# Patient Record
Sex: FEMALE | Race: WHITE
[De-identification: names, ages, dates, MRNs, and addresses within clinical notes are randomized per-mention and may not be internally consistent; named-entity substitution may affect disease eponyms.]

---

## 2017-01-04 ENCOUNTER — HOSPITAL ENCOUNTER (EMERGENCY)
Age: 82
Discharge: HOME | End: 2017-01-04
Payer: MEDICARE

## 2017-01-04 DIAGNOSIS — Z86.73: ICD-10-CM

## 2017-01-04 DIAGNOSIS — M19.90: ICD-10-CM

## 2017-01-04 DIAGNOSIS — Z79.82: ICD-10-CM

## 2017-01-04 DIAGNOSIS — Z87.891: ICD-10-CM

## 2017-01-04 DIAGNOSIS — K21.9: ICD-10-CM

## 2017-01-04 DIAGNOSIS — A09: Primary | ICD-10-CM

## 2017-01-04 DIAGNOSIS — E03.9: ICD-10-CM

## 2017-01-04 DIAGNOSIS — I10: ICD-10-CM

## 2017-01-04 PROCEDURE — 87493 C DIFF AMPLIFIED PROBE: CPT

## 2017-01-04 PROCEDURE — 83690 ASSAY OF LIPASE: CPT

## 2017-01-04 PROCEDURE — 85025 COMPLETE CBC W/AUTO DIFF WBC: CPT

## 2017-01-04 PROCEDURE — 36415 COLL VENOUS BLD VENIPUNCTURE: CPT

## 2017-01-04 PROCEDURE — 87045 FECES CULTURE AEROBIC BACT: CPT

## 2017-01-04 PROCEDURE — 99283 EMERGENCY DEPT VISIT LOW MDM: CPT

## 2017-01-04 PROCEDURE — 87046 STOOL CULTR AEROBIC BACT EA: CPT

## 2017-01-04 PROCEDURE — 80053 COMPREHEN METABOLIC PANEL: CPT

## 2017-02-21 ENCOUNTER — HOSPITAL ENCOUNTER (OUTPATIENT)
Age: 82
End: 2017-02-21
Payer: MEDICARE

## 2017-03-30 ENCOUNTER — HOSPITAL ENCOUNTER (OUTPATIENT)
Age: 82
Discharge: HOME | End: 2017-03-30
Payer: MEDICARE

## 2017-03-30 DIAGNOSIS — R31.9: Primary | ICD-10-CM

## 2017-06-23 ENCOUNTER — HOSPITAL ENCOUNTER (OUTPATIENT)
Dept: HOSPITAL 76 - LAB | Age: 82
Discharge: HOME | End: 2017-06-23
Attending: PSYCHIATRY & NEUROLOGY
Payer: MEDICARE

## 2017-06-23 DIAGNOSIS — G24.9: Primary | ICD-10-CM

## 2017-06-23 PROCEDURE — 86038 ANTINUCLEAR ANTIBODIES: CPT

## 2017-06-23 PROCEDURE — 86780 TREPONEMA PALLIDUM: CPT

## 2017-06-23 PROCEDURE — 82390 ASSAY OF CERULOPLASMIN: CPT

## 2017-06-23 PROCEDURE — 82525 ASSAY OF COPPER: CPT

## 2017-06-23 PROCEDURE — 36415 COLL VENOUS BLD VENIPUNCTURE: CPT

## 2017-06-26 LAB
ANA SER QL: NEGATIVE
T PALLIDUM AB SER QL IA: NEGATIVE

## 2017-06-28 LAB — COPPER SERPL-MCNC: 121 MCG/DL (ref 70–175)

## 2017-07-13 ENCOUNTER — HOSPITAL ENCOUNTER (OUTPATIENT)
Dept: HOSPITAL 76 - EMS | Age: 82
Discharge: ANOTHER HOSPITAL | End: 2017-07-13
Attending: SURGERY
Payer: MEDICARE

## 2017-07-13 DIAGNOSIS — R06.02: Primary | ICD-10-CM

## 2017-09-05 ENCOUNTER — HOSPITAL ENCOUNTER (OUTPATIENT)
Dept: HOSPITAL 76 - EMS | Age: 82
Discharge: ANOTHER HOSPITAL | End: 2017-09-05
Attending: SURGERY
Payer: MEDICARE

## 2017-09-05 DIAGNOSIS — R25.1: Primary | ICD-10-CM

## 2017-09-05 DIAGNOSIS — M79.604: ICD-10-CM

## 2018-03-29 ENCOUNTER — HOSPITAL ENCOUNTER (OUTPATIENT)
Dept: HOSPITAL 76 - EMS | Age: 83
End: 2018-03-29
Attending: SURGERY
Payer: MEDICARE

## 2018-03-29 DIAGNOSIS — R20.2: ICD-10-CM

## 2018-03-29 DIAGNOSIS — R06.02: Primary | ICD-10-CM

## 2019-03-08 ENCOUNTER — HOSPITAL ENCOUNTER (OUTPATIENT)
Dept: HOSPITAL 76 - EMS | Age: 84
Discharge: ANOTHER HOSPITAL | End: 2019-03-08
Attending: SURGERY
Payer: MEDICARE

## 2019-03-08 DIAGNOSIS — F41.9: Primary | ICD-10-CM

## 2019-03-08 DIAGNOSIS — R06.4: ICD-10-CM

## 2019-03-08 DIAGNOSIS — R25.3: ICD-10-CM

## 2019-03-18 ENCOUNTER — HOSPITAL ENCOUNTER (INPATIENT)
Dept: HOSPITAL 76 - ED | Age: 84
LOS: 4 days | Discharge: HOSPICE | DRG: 66 | End: 2019-03-22
Attending: NURSE PRACTITIONER | Admitting: INTERNAL MEDICINE
Payer: MEDICARE

## 2019-03-18 ENCOUNTER — HOSPITAL ENCOUNTER (OUTPATIENT)
Dept: HOSPITAL 76 - EMS | Age: 84
Discharge: ANOTHER HOSPITAL | End: 2019-03-18
Attending: SURGERY
Payer: MEDICARE

## 2019-03-18 DIAGNOSIS — M19.90: ICD-10-CM

## 2019-03-18 DIAGNOSIS — R32: ICD-10-CM

## 2019-03-18 DIAGNOSIS — F41.9: ICD-10-CM

## 2019-03-18 DIAGNOSIS — Z96.659: ICD-10-CM

## 2019-03-18 DIAGNOSIS — H91.90: ICD-10-CM

## 2019-03-18 DIAGNOSIS — I62.9: Primary | ICD-10-CM

## 2019-03-18 DIAGNOSIS — R40.4: ICD-10-CM

## 2019-03-18 DIAGNOSIS — K21.9: ICD-10-CM

## 2019-03-18 DIAGNOSIS — Z87.11: ICD-10-CM

## 2019-03-18 DIAGNOSIS — R46.4: Primary | ICD-10-CM
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
## 2019-03-18 DIAGNOSIS — Z66: ICD-10-CM

## 2019-03-18 DIAGNOSIS — E03.9: ICD-10-CM

## 2019-03-18 DIAGNOSIS — Z79.82: ICD-10-CM

## 2019-03-18 DIAGNOSIS — I10: ICD-10-CM
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
## 2019-03-18 DIAGNOSIS — R41.82: ICD-10-CM

## 2019-03-18 LAB
ALBUMIN DIAFP-MCNC: 4.2 G/DL (ref 3.2–5.5)
ALBUMIN/GLOB SERPL: 1.2 {RATIO} (ref 1–2.2)
ALP SERPL-CCNC: 74 IU/L (ref 42–121)
ALT SERPL W P-5'-P-CCNC: 19 IU/L (ref 10–60)
AMPHET UR QL SCN: NEGATIVE
ANION GAP SERPL CALCULATED.4IONS-SCNC: 10 MMOL/L (ref 6–13)
APAP SERPL-MCNC: < 10 UG/ML (ref 10–30)
AST SERPL W P-5'-P-CCNC: 27 IU/L (ref 10–42)
BASOPHILS NFR BLD AUTO: 0 10^3/UL (ref 0–0.1)
BASOPHILS NFR BLD AUTO: 0.7 %
BENZODIAZ UR QL SCN: NEGATIVE
BILIRUB BLD-MCNC: 0.6 MG/DL (ref 0.2–1)
BUN SERPL-MCNC: 18 MG/DL (ref 6–20)
CALCIUM UR-MCNC: 9 MG/DL (ref 8.5–10.3)
CHLORIDE SERPL-SCNC: 100 MMOL/L (ref 101–111)
CLARITY UR REFRACT.AUTO: CLEAR
CO2 SERPL-SCNC: 26 MMOL/L (ref 21–32)
COCAINE UR-SCNC: NEGATIVE UMOL/L
CREAT SERPLBLD-SCNC: 0.7 MG/DL (ref 0.4–1)
EOSINOPHIL # BLD AUTO: 0.2 10^3/UL (ref 0–0.7)
EOSINOPHIL NFR BLD AUTO: 2.8 %
ERYTHROCYTE [DISTWIDTH] IN BLOOD BY AUTOMATED COUNT: 13.1 % (ref 12–15)
GFRSERPLBLD MDRD-ARVRAT: 79 ML/MIN/{1.73_M2} (ref 89–?)
GLOBULIN SER-MCNC: 3.6 G/DL (ref 2.1–4.2)
GLUCOSE SERPL-MCNC: 146 MG/DL (ref 70–100)
GLUCOSE UR QL STRIP.AUTO: NEGATIVE MG/DL
HGB UR QL STRIP: 13.3 G/DL (ref 12–16)
INR PPP: 1.1 (ref 0.8–1.2)
KETONES UR QL STRIP.AUTO: NEGATIVE MG/DL
LIPASE SERPL-CCNC: 46 U/L (ref 22–51)
LYMPHOCYTES # SPEC AUTO: 1.3 10^3/UL (ref 1.5–3.5)
LYMPHOCYTES NFR BLD AUTO: 20.5 %
MCH RBC QN AUTO: 31.7 PG (ref 27–31)
MCHC RBC AUTO-ENTMCNC: 33.3 G/DL (ref 32–36)
MCV RBC AUTO: 95.2 FL (ref 81–99)
METHADONE UR QL SCN: NEGATIVE
METHAMPHET UR QL SCN: NEGATIVE
MONOCYTES # BLD AUTO: 0.5 10^3/UL (ref 0–1)
MONOCYTES NFR BLD AUTO: 7.8 %
NEUTROPHILS # BLD AUTO: 4.3 10^3/UL (ref 1.5–6.6)
NEUTROPHILS # SNV AUTO: 6.3 X10^3/UL (ref 4.8–10.8)
NEUTROPHILS NFR BLD AUTO: 68.2 %
NITRITE UR QL STRIP.AUTO: NEGATIVE
OPIATES UR QL SCN: NEGATIVE
PDW BLD AUTO: 8.8 FL (ref 7.9–10.8)
PH UR STRIP.AUTO: 6 PH (ref 5–7.5)
PLATELET # BLD: 201 10^3/UL (ref 130–450)
PROT SPEC-MCNC: 7.8 G/DL (ref 6.7–8.2)
PROT UR STRIP.AUTO-MCNC: 30 MG/DL
PROTHROM ACT/NOR PPP: 12.8 SECS (ref 9.9–12.6)
RBC # UR STRIP.AUTO: (no result) /UL
RBC # URNS HPF: (no result) /HPF (ref 0–5)
RBC MAR: 4.18 10^6/UL (ref 4.2–5.4)
SALICYLATES SERPL-MCNC: < 6 MG/DL
SODIUM SERPLBLD-SCNC: 136 MMOL/L (ref 135–145)
SP GR UR STRIP.AUTO: 1.02 (ref 1–1.03)
SQUAMOUS URNS QL MICRO: (no result)
UROBILINOGEN UR QL STRIP.AUTO: (no result) E.U./DL
UROBILINOGEN UR STRIP.AUTO-MCNC: NEGATIVE MG/DL
VOLATILE DRUGS POS SERPL SCN: (no result)

## 2019-03-18 PROCEDURE — 99285 EMERGENCY DEPT VISIT HI MDM: CPT

## 2019-03-18 PROCEDURE — 87275 INFLUENZA B AG IF: CPT

## 2019-03-18 PROCEDURE — 87276 INFLUENZA A AG IF: CPT

## 2019-03-18 PROCEDURE — 81001 URINALYSIS AUTO W/SCOPE: CPT

## 2019-03-18 PROCEDURE — 85610 PROTHROMBIN TIME: CPT

## 2019-03-18 PROCEDURE — 36415 COLL VENOUS BLD VENIPUNCTURE: CPT

## 2019-03-18 PROCEDURE — 96374 THER/PROPH/DIAG INJ IV PUSH: CPT

## 2019-03-18 PROCEDURE — 81003 URINALYSIS AUTO W/O SCOPE: CPT

## 2019-03-18 PROCEDURE — 85025 COMPLETE CBC W/AUTO DIFF WBC: CPT

## 2019-03-18 PROCEDURE — 80053 COMPREHEN METABOLIC PANEL: CPT

## 2019-03-18 PROCEDURE — 87086 URINE CULTURE/COLONY COUNT: CPT

## 2019-03-18 PROCEDURE — 84484 ASSAY OF TROPONIN QUANT: CPT

## 2019-03-18 PROCEDURE — 80320 DRUG SCREEN QUANTALCOHOLS: CPT

## 2019-03-18 PROCEDURE — 93005 ELECTROCARDIOGRAM TRACING: CPT

## 2019-03-18 PROCEDURE — 80306 DRUG TEST PRSMV INSTRMNT: CPT

## 2019-03-18 PROCEDURE — 70450 CT HEAD/BRAIN W/O DYE: CPT

## 2019-03-18 PROCEDURE — 83690 ASSAY OF LIPASE: CPT

## 2019-03-18 PROCEDURE — 99284 EMERGENCY DEPT VISIT MOD MDM: CPT

## 2019-03-18 PROCEDURE — 80329 ANALGESICS NON-OPIOID 1 OR 2: CPT

## 2019-03-18 PROCEDURE — 80307 DRUG TEST PRSMV CHEM ANLYZR: CPT

## 2019-03-18 NOTE — ED PHYSICIAN DOCUMENTATION
History of Present Illness





- Stated complaint


Stated Complaint: ALTERED MENTAL STATUS





- Chief complaint


Chief Complaint: General





- History obtained from


History obtained from: EMS





- Additonal information


Additional information: 





88-year-old female was sent in from her nursing facility where she has on the 

independent care side for altered mental status.  Normally the patient's alert 

and able to function independently.  The patient is unable to provide any 

history





Review of Systems


Unable to obtain: AMS





PD PAST MEDICAL HISTORY





- Past Medical History


Cardiovascular: Hypertension


Respiratory: None


Endocrine/Autoimmune: HyPOthyroidism


GI: GERD, Ulcers


: Incontinence


HEENT: Chronic hearing loss


Psych: Anxiety


Musculoskeletal: Osteoarthritis


Derm: None





- Past Surgical History


Past Surgical History: Yes


General: Cholecystectomy, Appendectomy


Ortho: Knee replacement


/GYN: Hysterectomy


HEENT: Tonsil/Adenoidectomy





- Present Medications


Home Medications: 


                                Ambulatory Orders











 Medication  Instructions  Recorded  Confirmed


 


Aspirin [Children's Aspirin] 81 mg PO DAILY 10/25/13 03/18/19


 


Atenolol 50 mg PO DAILY 10/25/13 03/18/19


 


Levothyroxine [Synthroid] 88 mcg PO QDAC 10/25/13 03/18/19


 


diltiaZEM CD [Cardizem Cd] 180 mg PO DAILY 10/25/13 03/18/19


 


Losartan Potassium 50 mg PO DAILY 04/07/15 03/18/19


 


Diphenoxylate HCl/Atropine 1 each PO Q6H PRN #20 tablet 01/04/17 03/18/19





[Lomotil 2.5-0.025 mg Tablet]   


 


Ondansetron Odt [Zofran] 4 mg TL Q6H PRN #15 tablet 01/04/17 03/18/19














- Allergies


Allergies/Adverse Reactions: 


                                    Allergies











Allergy/AdvReac Type Severity Reaction Status Date / Time


 


ACE Inhibitors Allergy  Unknown Verified 03/18/19 17:52


 


Benzodiazepines Allergy  Unknown Verified 03/18/19 17:52


 


Tetracyclines Allergy  Unknown Verified 03/18/19 17:52


 


novacaine AdvReac  unknown Uncoded 10/24/13 14:44














- Social History


Does the pt smoke?: No


Smoking Status: Never smoker


Does the pt drink ETOH?: No


Does the pt have substance abuse?: No





- Immunizations


Immunizations are current?: No


Immunizations: TDAP >10years/unknown





- POLST


Patient has POLST: Yes





PD ED PE NORMAL





- General


General: Other (The patient is altered, not conversant and provides no history)





- HEENT


HEENT: Atraumatic, PERRL





- Cardiac


Cardiac: RRR, Strong equal pulses





- Respiratory


Respiratory: No respiratory distress





- Abdomen


Abdomen: Soft, Non tender





- Derm


Derm: Normal color





- Extremities


Extremities: No deformity





- Neuro


Neuro: Other (The patient will arouse to voice, the patient follows no commands,

 the patient appears to be moving all 4 extremities)





Results





- Vitals


Vitals: 


                               Vital Signs - 24 hr











  03/18/19 03/18/19 03/18/19





  17:48 18:17 19:12


 


Temperature 36.3 C L  


 


Heart Rate 70 68 77


 


Respiratory 20 18 18





Rate   


 


Blood Pressure 207/83 H 222/85 H 200/80 H


 


O2 Saturation 94 94 95














  03/18/19 03/18/19





  19:54 20:38


 


Temperature  


 


Heart Rate 69 69


 


Respiratory 18 20





Rate  


 


Blood Pressure 192/101 H 179/83 H


 


O2 Saturation 96 95








                                     Oxygen











O2 Source                      Room air

















- EKG (time done)


  ** EKG


Rhythm: NSR


Intervals: Normal MI, QRS normal


QRS: Normal


Ischemia: Normal ST segments





- Labs


Labs: 


                                Laboratory Tests











  03/18/19 03/18/19 03/18/19





  18:15 18:15 18:15


 


WBC   


 


RBC   


 


Hgb   


 


Hct   


 


MCV   


 


MCH   


 


MCHC   


 


RDW   


 


Plt Count   


 


MPV   


 


Neut # (Auto)   


 


Lymph # (Auto)   


 


Mono # (Auto)   


 


Eos # (Auto)   


 


Baso # (Auto)   


 


Absolute Nucleated RBC   


 


Nucleated RBC %   


 


PT   


 


INR   


 


Sodium   


 


Potassium   


 


Chloride   


 


Carbon Dioxide   


 


Anion Gap   


 


BUN   


 


Creatinine   


 


Estimated GFR (MDRD)   


 


Glucose   


 


Calcium   


 


Total Bilirubin   


 


AST   


 


ALT   


 


Alkaline Phosphatase   


 


Troponin I   


 


Total Protein   


 


Albumin   


 


Globulin   


 


Albumin/Globulin Ratio   


 


Lipase   


 


Urine Color  YELLOW  


 


Urine Clarity  CLEAR  


 


Urine pH  6.0  


 


Ur Specific Gravity  1.020  


 


Urine Protein  30 H  


 


Urine Glucose (UA)  NEGATIVE  


 


Urine Ketones  NEGATIVE  


 


Urine Occult Blood  TRACE-INTA  


 


Urine Nitrite  NEGATIVE  


 


Urine Bilirubin  NEGATIVE  


 


Urine Urobilinogen  0.2 (NORMAL)  


 


Ur Leukocyte Esterase  NEGATIVE  


 


Urine RBC  0-5  


 


Urine WBC  0-3  


 


Ur Squamous Epith Cells  RARE Squamous  


 


Urine Bacteria  None Seen  


 


Ur Microscopic Review  INDICATED  


 


Urine Culture Comments  NOT INDICATED  


 


Salicylates   


 


Urine Opiates Screen   NEGATIVE 


 


Ur Oxycodone Screen   NEGATIVE 


 


Urine Methadone Screen   NEGATIVE 


 


Ur Propoxyphene Screen   NEGATIVE 


 


Acetaminophen   


 


Ur Barbiturates Screen   NEGATIVE 


 


Ur Tricyclics Screen   NEGATIVE 


 


Ur Phencyclidine Scrn   NEGATIVE 


 


Ur Amphetamine Screen   NEGATIVE 


 


U Methamphetamines Scrn   NEGATIVE 


 


U Benzodiazepines Scrn   NEGATIVE 


 


Urine Cocaine Screen   NEGATIVE 


 


U Cannabinoids Screen   NEGATIVE 


 


Ethyl Alcohol   


 


Influenza A (Rapid)    Negative


 


Influenza B (Rapid)    Negative














  03/18/19 03/18/19 03/18/19





  18:18 18:18 18:18


 


WBC  6.3  


 


RBC  4.18 L  


 


Hgb  13.3  


 


Hct  39.8  


 


MCV  95.2  


 


MCH  31.7 H  


 


MCHC  33.3  


 


RDW  13.1  


 


Plt Count  201  


 


MPV  8.8  


 


Neut # (Auto)  4.3  


 


Lymph # (Auto)  1.3 L  


 


Mono # (Auto)  0.5  


 


Eos # (Auto)  0.2  


 


Baso # (Auto)  0.0  


 


Absolute Nucleated RBC  0.00  


 


Nucleated RBC %  0.0  


 


PT   12.8 H 


 


INR   1.1 


 


Sodium    136


 


Potassium    3.3 L


 


Chloride    100 L


 


Carbon Dioxide    26


 


Anion Gap    10.0


 


BUN    18


 


Creatinine    0.7


 


Estimated GFR (MDRD)    79 L


 


Glucose    146 H


 


Calcium    9.0


 


Total Bilirubin    0.6


 


AST    27


 


ALT    19


 


Alkaline Phosphatase    74


 


Troponin I   


 


Total Protein    7.8


 


Albumin    4.2


 


Globulin    3.6


 


Albumin/Globulin Ratio    1.2


 


Lipase    46


 


Urine Color   


 


Urine Clarity   


 


Urine pH   


 


Ur Specific Gravity   


 


Urine Protein   


 


Urine Glucose (UA)   


 


Urine Ketones   


 


Urine Occult Blood   


 


Urine Nitrite   


 


Urine Bilirubin   


 


Urine Urobilinogen   


 


Ur Leukocyte Esterase   


 


Urine RBC   


 


Urine WBC   


 


Ur Squamous Epith Cells   


 


Urine Bacteria   


 


Ur Microscopic Review   


 


Urine Culture Comments   


 


Salicylates    < 6.0


 


Urine Opiates Screen   


 


Ur Oxycodone Screen   


 


Urine Methadone Screen   


 


Ur Propoxyphene Screen   


 


Acetaminophen    < 10 L


 


Ur Barbiturates Screen   


 


Ur Tricyclics Screen   


 


Ur Phencyclidine Scrn   


 


Ur Amphetamine Screen   


 


U Methamphetamines Scrn   


 


U Benzodiazepines Scrn   


 


Urine Cocaine Screen   


 


U Cannabinoids Screen   


 


Ethyl Alcohol    < 5.0


 


Influenza A (Rapid)   


 


Influenza B (Rapid)   














  03/18/19





  18:18


 


WBC 


 


RBC 


 


Hgb 


 


Hct 


 


MCV 


 


MCH 


 


MCHC 


 


RDW 


 


Plt Count 


 


MPV 


 


Neut # (Auto) 


 


Lymph # (Auto) 


 


Mono # (Auto) 


 


Eos # (Auto) 


 


Baso # (Auto) 


 


Absolute Nucleated RBC 


 


Nucleated RBC % 


 


PT 


 


INR 


 


Sodium 


 


Potassium 


 


Chloride 


 


Carbon Dioxide 


 


Anion Gap 


 


BUN 


 


Creatinine 


 


Estimated GFR (MDRD) 


 


Glucose 


 


Calcium 


 


Total Bilirubin 


 


AST 


 


ALT 


 


Alkaline Phosphatase 


 


Troponin I  < 0.04


 


Total Protein 


 


Albumin 


 


Globulin 


 


Albumin/Globulin Ratio 


 


Lipase 


 


Urine Color 


 


Urine Clarity 


 


Urine pH 


 


Ur Specific Gravity 


 


Urine Protein 


 


Urine Glucose (UA) 


 


Urine Ketones 


 


Urine Occult Blood 


 


Urine Nitrite 


 


Urine Bilirubin 


 


Urine Urobilinogen 


 


Ur Leukocyte Esterase 


 


Urine RBC 


 


Urine WBC 


 


Ur Squamous Epith Cells 


 


Urine Bacteria 


 


Ur Microscopic Review 


 


Urine Culture Comments 


 


Salicylates 


 


Urine Opiates Screen 


 


Ur Oxycodone Screen 


 


Urine Methadone Screen 


 


Ur Propoxyphene Screen 


 


Acetaminophen 


 


Ur Barbiturates Screen 


 


Ur Tricyclics Screen 


 


Ur Phencyclidine Scrn 


 


Ur Amphetamine Screen 


 


U Methamphetamines Scrn 


 


U Benzodiazepines Scrn 


 


Urine Cocaine Screen 


 


U Cannabinoids Screen 


 


Ethyl Alcohol 


 


Influenza A (Rapid) 


 


Influenza B (Rapid) 














- Rads (name of study)


  ** CT head


Radiology: Final report received, See rad report





PD MEDICAL DECISION MAKING





- ED course


ED course: 


On further review of the patient's records she has a POLST Form on file which 

states that she is comfort care.  The patient's sister who is her power of 

 arrived after the findings on the patient's workup.  She confirms that 

the patient is comfort care and would not want any measures done further to 

treat the patient's acute findings. She states that the patient would only want 

to be comfortable.  She understands the gravity of the patient's condition.  The

 findings were discussed with the hospitalist who agrees to admit the patient 

for comfort care measures.





Departure





- Departure


Disposition: [facility] DC/Xfer


Clinical Impression: 


 Comfort measures only status





ICH (intracerebral hemorrhage)


Qualifiers:


 Intracerebral hemorrhage etiology: nontraumatic Cerebral hemorrhage location: 

unspecified cerebral location Laterality: unspecified laterality Qualified 

Code(s): I61.9 - Nontraumatic intracerebral hemorrhage, unspecified





Condition: Critical

## 2019-03-18 NOTE — CT REPORT
Reason:  ams

Procedure Date:  03/18/2019   

Accession Number:  [id number] / [id number]                    

Procedure:  CT  - HEAD WO CPT Code:  

 

FULL RESULT:

 

 

EXAM:

CT HEAD

 

EXAM DATE: 3/18/2019 06:42 PM.

 

CLINICAL HISTORY: Ams.

 

COMPARISON: HEAD W/O 09/27/2015 5:05 PM.

 

TECHNIQUE: Multiaxial CT images were obtained from the foramen magnum to 

the vertex. Reformats: Sagittal and coronal. IV contrast: None.

 

In accordance with CT protocol optimization, one or more of the following 

dose reduction techniques were utilized for this exam: automated exposure 

control, adjustment of mA and/or KV based on patient size, or use of 

iterative reconstructive technique.

 

FINDINGS:

Parenchyma: New acute hemorrhage in the left quadrigeminal cistern, along 

the tentorium bilaterally, and right cerebellum. No midline shift. Deep 

white matter low attenuation likely reflects chronic micro-angiopathic 

ischemic change. Mild global volume loss. Encephalomalacia in the right 

posterior parietal lobe.

 

Ventricles: Mild ex-vacuo dilatation

 

Sinuses and Orbits: Imaged paranasal sinuses, orbits, and mastoids show 

no significant abnormality.

 

Bones: No evidence of fracture or calvarial defect.

 

Other: None.

IMPRESSION: New acute intracranial hemorrhage in the left quadrigeminal 

cistern, bilateral adjacent tentorium, and right cerebellum. Contrast 

enhanced CTA or angiogram is recommended to assess for aneurysm.

 

RADIA

 

The critical result notification system was initiated by Dr. Yousuf German 

at 07:19 PM on 3/18/2019.

 

The above critical result findings  were discussed with Xavi Morales 

by Dr. Yousuf German at 07:28 PM on 3/18/2019.

## 2019-03-19 RX ADMIN — ONDANSETRON PRN MG: 2 INJECTION INTRAMUSCULAR; INTRAVENOUS at 06:35

## 2019-03-19 RX ADMIN — ONDANSETRON PRN MG: 2 INJECTION INTRAMUSCULAR; INTRAVENOUS at 14:31

## 2019-03-19 RX ADMIN — MORPHINE SULFATE PRN MG: 2 INJECTION, SOLUTION INTRAMUSCULAR; INTRAVENOUS at 10:50

## 2019-03-19 NOTE — HISTORY & PHYSICAL EXAMINATION
Chief Complaint





- Chief Complaint


Chief Complaint: altered mental status





History of Present Illness





- Admitted From


Admitted From:: [facility] ED





- History Obtained From


Records Reviewed: yes


History obtained from: ED physician


Exam Limitations: altered mental status





- History of Present Illness


HPI Comment/Other: 


88-year-old female was sent in from her nursing facility where she has on the 

independent care side for altered mental status.  Normally the patient's alert 

and able to function independently.  The patient is unable to provide any 

history


Her sister Sara was present at bedside during my exam. She has been been 

informed by the facility that the patient had fallen and call out while down. 

Someone passing by heard her and came to her aid,then she was brought to the ED.


Patient is currently altered. She appears very nauseous and vomits 

intermittently. She complains of a head ache. She is moving all extremities. She

has intermittent apneic periods. She startles easily.


Work up in the ED included a CT of her brain which acute intracranial 

hemorrhage. 


Patient has a POLST form stating comfort-measures only








History





- Past Medical History


Cardiovascular: reports: Hypertension


Respiratory: reports: None


Endocrine/Autoimmune: reports: HyPOthyroidism


GI: reports: GERD, Ulcers


: reports: Incontinence


HEENT: reports: Chronic hearing loss


Psych: reports: Anxiety


Musculoskeletal: reports: Osteoarthritis


Derm: reports: None


MRSA Hx?: No





- Past Surgical History


General: reports: Cholecystectomy, Appendectomy


Ortho: reports: Knee replacement


/GYN: reports: Hysterectomy


HEENT: reports: Tonsil/Adenoidectomy





- POLST


Patient has POLST: Yes


POLST Status: Comfort measures only





Meds/Allgy





- Home Medications


Home Medications: 


                                Ambulatory Orders











 Medication  Instructions  Recorded  Confirmed


 


Aspirin [Children's Aspirin] 81 mg PO DAILY 10/25/13 03/18/19


 


Atenolol 50 mg PO DAILY 10/25/13 03/18/19


 


Levothyroxine [Synthroid] 88 mcg PO QDAC 10/25/13 03/18/19


 


diltiaZEM CD [Cardizem Cd] 180 mg PO DAILY 10/25/13 03/18/19


 


Losartan Potassium 50 mg PO DAILY 04/07/15 03/18/19


 


Diphenoxylate HCl/Atropine 1 each PO Q6H PRN #20 tablet 01/04/17 03/18/19





[Lomotil 2.5-0.025 mg Tablet]   


 


Ondansetron Odt [Zofran] 4 mg TL Q6H PRN #15 tablet 01/04/17 03/18/19














- Allergies


Allergies/Adverse Reactions: 


                                    Allergies











Allergy/AdvReac Type Severity Reaction Status Date / Time


 


ACE Inhibitors Allergy  Unknown Verified 03/18/19 17:52


 


Benzodiazepines Allergy  Unknown Verified 03/18/19 17:52


 


Tetracyclines Allergy  Unknown Verified 03/18/19 17:52


 


novacaine AdvReac  unknown Uncoded 10/24/13 14:44














Review of Systems





- Other Findings


Other Findings: 





ROS is limited by patient's altered mental status





Exam





- Vital Signs


Vital Signs: 





                                Vital Signs x48h











  Temp Pulse Pulse Resp BP BP Pulse Ox


 


 03/18/19 21:35  36.5 C   71  20   184/65 H  94


 


 03/18/19 21:07   70   16  180/76 H   99


 


 03/18/19 20:38   69   20  179/83 H   95


 


 03/18/19 19:54   69   18  192/101 H   96


 


 03/18/19 19:12   77   18  200/80 H   95


 


 03/18/19 18:17   68   18  222/85 H   94


 


 03/18/19 17:48  36.3 C L  70   20  207/83 H   94














- Physical Exam


General Appearance: positive: Moderate distress, Lethargic


Eyes Bilateral: positive: Normal inspection


ENT: positive: ENT inspection nml


Neck: positive: Nml inspection, No JVD, Trachea midline


Respiratory: positive: Other (intermittent apneic periods).  negative: Wheezes, 

Rales, Rhonchi


Cardiovascular: positive: Regular rate & rhythm, Systolic murmur


Abdomen: positive: Non-tender, Nml bowel sounds, No distention


Skin: positive: Color nml, No rash, Warm, Dry


Extremities: positive: Non-tender, Nml appearance, No pedal edema


Neurologic/Psychiatric: positive: Depressed mood/affect





Conclusion/Plan





- Problem List


(1) ICH (intracerebral hemorrhage)


Conclusion/Plan: 


Patient's POLST form indicated comfort measures only


Her sister Sara was at bedside. Implications of this to her care were explained

 to her sister


Goal of care are directed for comfort only


Explain to sister that patient may not last the night.


Advised to reach out to family and loved ones who would like to see her or say 

their good byes


Will consult hospice care in the morning to help facilitate transition to out 

patient.


Qualifiers: 


   Intracerebral hemorrhage etiology: nontraumatic   Cerebral hemorrhage 

location: unspecified cerebral location   Laterality: unspecified laterality   

Qualified Code(s): I61.9 - Nontraumatic intracerebral hemorrhage, unspecified   





- Lab Results


Fish Bones: 


                                 03/18/19 18:18





                                 03/18/19 18:18





Core Measures





- Anticipated LOS


I expect patient to be DC'd or transferred within 96 hours.: Yes





- DVT/VTE - Prophylaxis


VTE/DVT Device ordered at admit?: No


Not Ordered - Medical Reason: Contraindicated


VTE/DVT Prophylaxis med ordered at admit?: No


Not Ordered - Medical Reason: Contraindicated

## 2019-03-19 NOTE — PROVIDER PROGRESS NOTE
Subjective





- Prog Note Date


Prog Note Date: 03/19/19


Prog Note Time: 10:45





- Subjective


Subjective: 


Patient goes in and out of sleep


She is not hungry, but endorses thirst


She is wondering how long until her ICH will kill her


Complaining of a headache


She is very sensitive to smells and has been so for the past year, per her 

sister Sara.





Current Medications





- Current Medications


Current Medications: 


Glycopyrrolate (Robinul)  0.2 mg SUBQ Q4H PRN


   PRN Reason: Excessive secretions


Sodium Chloride (Normal Saline 0.9%)  1,000 mls @ 0 mls/hr IV .Q0M LISY


   Last Admin: 03/18/19 22:45 Dose:  50 mls/hr


Lorazepam (Ativan Inj (Vial))  1 mg IVP Q6H PRN


   PRN Reason: Anxiety/Agitation


Morphine Sulfate (Morphine)  2 mg IVP Q2HR PRN


   PRN Reason: PAIN OR SHORTNESS OF AIR


   Last Admin: 03/19/19 10:50 Dose:  2 mg


Ondansetron HCl (Zofran Inj)  4 mg IVP Q8H PRN


   PRN Reason: Nausea / Vomiting


   Last Admin: 03/19/19 06:35 Dose:  4 mg








Objective





- Vital Signs/Intake & Output


Reviewed Vital Signs: Yes


Vital Signs: 


                                Vital Signs x48h











  Pulse Pulse Ox


 


 03/19/19 06:00  66  94











Intake & Output: 


                                 Intake & Output











 03/16/19 03/17/19 03/18/19 03/19/19





 23:59 23:59 23:59 23:59


 


Output Total    250


 


Balance    -250














- Objective


General Appearance: positive: No acute distress, Other (alert with periods of 

drowsiness)


Eyes Bilateral: positive: Normal inspection


ENT: positive: Dry mucous membranes, Other (Very hard of hearing.)


Neck: positive: Nml inspection, Trachea midline


Respiratory: positive: Chest non-tender, No respiratory distress, Breath sounds 

nml


Cardiovascular: positive: Regular rate & rhythm, No murmur, No gallop


Peripheral Pulses: 2+ Dorsalis pedis (R), 2+ Dorsalis pedis (L)


Abdomen: positive: Non-tender


Skin: positive: Warm, Dry


Neurologic/Psychiatric: positive: Oriented x3, Sensation nml, Mood/affect nml





- Lab Results


Fish Bones: 


                                 03/18/19 18:18





                                 03/18/19 18:18


Other Labs: 


                               Lab Results x24hrs











  03/18/19 03/18/19 03/18/19 Range/Units





  18:18 18:18 18:18 


 


WBC     (4.8-10.8)  x10^3/uL


 


RBC     (4.20-5.40)  10^6/uL


 


Hgb     (12.0-16.0)  g/dL


 


Hct     (37.0-47.0)  %


 


MCV     (81.0-99.0)  fL


 


MCH     (27.0-31.0)  pg


 


MCHC     (32.0-36.0)  g/dL


 


RDW     (12.0-15.0)  %


 


Plt Count     (130-450)  10^3/uL


 


MPV     (7.9-10.8)  fL


 


Neut # (Auto)     (1.5-6.6)  10^3/uL


 


Lymph # (Auto)     (1.5-3.5)  10^3/uL


 


Mono # (Auto)     (0.0-1.0)  10^3/uL


 


Eos # (Auto)     (0.0-0.7)  10^3/uL


 


Baso # (Auto)     (0.0-0.1)  10^3/uL


 


Absolute Nucleated RBC     x10^3/uL


 


Nucleated RBC %     /100WBC


 


PT    12.8 H  (9.9-12.6)  secs


 


INR    1.1  (0.8-1.2)  


 


Sodium   136   (135-145)  mmol/L


 


Potassium   3.3 L   (3.5-5.0)  mmol/L


 


Chloride   100 L   (101-111)  mmol/L


 


Carbon Dioxide   26   (21-32)  mmol/L


 


Anion Gap   10.0   (6-13)  


 


BUN   18   (6-20)  mg/dL


 


Creatinine   0.7   (0.4-1.0)  mg/dL


 


Estimated GFR (MDRD)   79 L   (>89)  


 


Glucose   146 H   ()  mg/dL


 


Calcium   9.0   (8.5-10.3)  mg/dL


 


Total Bilirubin   0.6   (0.2-1.0)  mg/dL


 


AST   27   (10-42)  IU/L


 


ALT   19   (10-60)  IU/L


 


Alkaline Phosphatase   74   ()  IU/L


 


Troponin I  < 0.04    (<0.49)  ng/mL


 


Total Protein   7.8   (6.7-8.2)  g/dL


 


Albumin   4.2   (3.2-5.5)  g/dL


 


Globulin   3.6   (2.1-4.2)  g/dL


 


Albumin/Globulin Ratio   1.2   (1.0-2.2)  


 


Lipase   46   (22-51)  U/L


 


Urine Color     


 


Urine Clarity     (CLEAR)  


 


Urine pH     (5.0-7.5)  PH


 


Ur Specific Gravity     (1.002-1.030)  


 


Urine Protein     (NEGATIVE)  mg/dL


 


Urine Glucose (UA)     (NEGATIVE)  mg/dL


 


Urine Ketones     (NEGATIVE)  mg/dL


 


Urine Occult Blood     (NEGATIVE)  


 


Urine Nitrite     (NEGATIVE)  


 


Urine Bilirubin     (NEGATIVE)  


 


Urine Urobilinogen     (NORMAL)  E.U./dL


 


Ur Leukocyte Esterase     (NEGATIVE)  


 


Urine RBC     (0-5)  /HPF


 


Urine WBC     (0-5)  /HPF


 


Ur Squamous Epith Cells     (<= Few)  


 


Urine Bacteria     (None Seen)  /HPF


 


Ur Microscopic Review     


 


Urine Culture Comments     


 


Salicylates   < 6.0   mg/dL


 


Urine Opiates Screen     (NEGATIVE)  


 


Ur Oxycodone Screen     (NEGATIVE)  


 


Urine Methadone Screen     (NEGATIVE)  


 


Ur Propoxyphene Screen     (NEGATIVE)  


 


Acetaminophen   < 10 L   (10-30)  ug/mL


 


Ur Barbiturates Screen     (NEGATIVE)  


 


Ur Tricyclics Screen     (NEGATIVE)  


 


Ur Phencyclidine Scrn     (NEGATIVE)  


 


Ur Amphetamine Screen     (NEGATIVE)  


 


U Methamphetamines Scrn     (NEGATIVE)  


 


U Benzodiazepines Scrn     (NEGATIVE)  


 


Urine Cocaine Screen     (NEGATIVE)  


 


U Cannabinoids Screen     (NEGATIVE)  


 


Ethyl Alcohol   < 5.0   mg/dL


 


Influenza A (Rapid)     (Negative)  


 


Influenza B (Rapid)     (Negative)  














  03/18/19 03/18/19 03/18/19 Range/Units





  18:18 18:15 18:15 


 


WBC  6.3    (4.8-10.8)  x10^3/uL


 


RBC  4.18 L    (4.20-5.40)  10^6/uL


 


Hgb  13.3    (12.0-16.0)  g/dL


 


Hct  39.8    (37.0-47.0)  %


 


MCV  95.2    (81.0-99.0)  fL


 


MCH  31.7 H    (27.0-31.0)  pg


 


MCHC  33.3    (32.0-36.0)  g/dL


 


RDW  13.1    (12.0-15.0)  %


 


Plt Count  201    (130-450)  10^3/uL


 


MPV  8.8    (7.9-10.8)  fL


 


Neut # (Auto)  4.3    (1.5-6.6)  10^3/uL


 


Lymph # (Auto)  1.3 L    (1.5-3.5)  10^3/uL


 


Mono # (Auto)  0.5    (0.0-1.0)  10^3/uL


 


Eos # (Auto)  0.2    (0.0-0.7)  10^3/uL


 


Baso # (Auto)  0.0    (0.0-0.1)  10^3/uL


 


Absolute Nucleated RBC  0.00    x10^3/uL


 


Nucleated RBC %  0.0    /100WBC


 


PT     (9.9-12.6)  secs


 


INR     (0.8-1.2)  


 


Sodium     (135-145)  mmol/L


 


Potassium     (3.5-5.0)  mmol/L


 


Chloride     (101-111)  mmol/L


 


Carbon Dioxide     (21-32)  mmol/L


 


Anion Gap     (6-13)  


 


BUN     (6-20)  mg/dL


 


Creatinine     (0.4-1.0)  mg/dL


 


Estimated GFR (MDRD)     (>89)  


 


Glucose     ()  mg/dL


 


Calcium     (8.5-10.3)  mg/dL


 


Total Bilirubin     (0.2-1.0)  mg/dL


 


AST     (10-42)  IU/L


 


ALT     (10-60)  IU/L


 


Alkaline Phosphatase     ()  IU/L


 


Troponin I     (<0.49)  ng/mL


 


Total Protein     (6.7-8.2)  g/dL


 


Albumin     (3.2-5.5)  g/dL


 


Globulin     (2.1-4.2)  g/dL


 


Albumin/Globulin Ratio     (1.0-2.2)  


 


Lipase     (22-51)  U/L


 


Urine Color     


 


Urine Clarity     (CLEAR)  


 


Urine pH     (5.0-7.5)  PH


 


Ur Specific Gravity     (1.002-1.030)  


 


Urine Protein     (NEGATIVE)  mg/dL


 


Urine Glucose (UA)     (NEGATIVE)  mg/dL


 


Urine Ketones     (NEGATIVE)  mg/dL


 


Urine Occult Blood     (NEGATIVE)  


 


Urine Nitrite     (NEGATIVE)  


 


Urine Bilirubin     (NEGATIVE)  


 


Urine Urobilinogen     (NORMAL)  E.U./dL


 


Ur Leukocyte Esterase     (NEGATIVE)  


 


Urine RBC     (0-5)  /HPF


 


Urine WBC     (0-5)  /HPF


 


Ur Squamous Epith Cells     (<= Few)  


 


Urine Bacteria     (None Seen)  /HPF


 


Ur Microscopic Review     


 


Urine Culture Comments     


 


Salicylates     mg/dL


 


Urine Opiates Screen    NEGATIVE  (NEGATIVE)  


 


Ur Oxycodone Screen    NEGATIVE  (NEGATIVE)  


 


Urine Methadone Screen    NEGATIVE  (NEGATIVE)  


 


Ur Propoxyphene Screen    NEGATIVE  (NEGATIVE)  


 


Acetaminophen     (10-30)  ug/mL


 


Ur Barbiturates Screen    NEGATIVE  (NEGATIVE)  


 


Ur Tricyclics Screen    NEGATIVE  (NEGATIVE)  


 


Ur Phencyclidine Scrn    NEGATIVE  (NEGATIVE)  


 


Ur Amphetamine Screen    NEGATIVE  (NEGATIVE)  


 


U Methamphetamines Scrn    NEGATIVE  (NEGATIVE)  


 


U Benzodiazepines Scrn    NEGATIVE  (NEGATIVE)  


 


Urine Cocaine Screen    NEGATIVE  (NEGATIVE)  


 


U Cannabinoids Screen    NEGATIVE  (NEGATIVE)  


 


Ethyl Alcohol     mg/dL


 


Influenza A (Rapid)   Negative   (Negative)  


 


Influenza B (Rapid)   Negative   (Negative)  














  03/18/19 Range/Units





  18:15 


 


WBC   (4.8-10.8)  x10^3/uL


 


RBC   (4.20-5.40)  10^6/uL


 


Hgb   (12.0-16.0)  g/dL


 


Hct   (37.0-47.0)  %


 


MCV   (81.0-99.0)  fL


 


MCH   (27.0-31.0)  pg


 


MCHC   (32.0-36.0)  g/dL


 


RDW   (12.0-15.0)  %


 


Plt Count   (130-450)  10^3/uL


 


MPV   (7.9-10.8)  fL


 


Neut # (Auto)   (1.5-6.6)  10^3/uL


 


Lymph # (Auto)   (1.5-3.5)  10^3/uL


 


Mono # (Auto)   (0.0-1.0)  10^3/uL


 


Eos # (Auto)   (0.0-0.7)  10^3/uL


 


Baso # (Auto)   (0.0-0.1)  10^3/uL


 


Absolute Nucleated RBC   x10^3/uL


 


Nucleated RBC %   /100WBC


 


PT   (9.9-12.6)  secs


 


INR   (0.8-1.2)  


 


Sodium   (135-145)  mmol/L


 


Potassium   (3.5-5.0)  mmol/L


 


Chloride   (101-111)  mmol/L


 


Carbon Dioxide   (21-32)  mmol/L


 


Anion Gap   (6-13)  


 


BUN   (6-20)  mg/dL


 


Creatinine   (0.4-1.0)  mg/dL


 


Estimated GFR (MDRD)   (>89)  


 


Glucose   ()  mg/dL


 


Calcium   (8.5-10.3)  mg/dL


 


Total Bilirubin   (0.2-1.0)  mg/dL


 


AST   (10-42)  IU/L


 


ALT   (10-60)  IU/L


 


Alkaline Phosphatase   ()  IU/L


 


Troponin I   (<0.49)  ng/mL


 


Total Protein   (6.7-8.2)  g/dL


 


Albumin   (3.2-5.5)  g/dL


 


Globulin   (2.1-4.2)  g/dL


 


Albumin/Globulin Ratio   (1.0-2.2)  


 


Lipase   (22-51)  U/L


 


Urine Color  YELLOW  


 


Urine Clarity  CLEAR  (CLEAR)  


 


Urine pH  6.0  (5.0-7.5)  PH


 


Ur Specific Gravity  1.020  (1.002-1.030)  


 


Urine Protein  30 H  (NEGATIVE)  mg/dL


 


Urine Glucose (UA)  NEGATIVE  (NEGATIVE)  mg/dL


 


Urine Ketones  NEGATIVE  (NEGATIVE)  mg/dL


 


Urine Occult Blood  TRACE-INTA  (NEGATIVE)  


 


Urine Nitrite  NEGATIVE  (NEGATIVE)  


 


Urine Bilirubin  NEGATIVE  (NEGATIVE)  


 


Urine Urobilinogen  0.2 (NORMAL)  (NORMAL)  E.U./dL


 


Ur Leukocyte Esterase  NEGATIVE  (NEGATIVE)  


 


Urine RBC  0-5  (0-5)  /HPF


 


Urine WBC  0-3  (0-5)  /HPF


 


Ur Squamous Epith Cells  RARE Squamous  (<= Few)  


 


Urine Bacteria  None Seen  (None Seen)  /HPF


 


Ur Microscopic Review  INDICATED  


 


Urine Culture Comments  NOT INDICATED  


 


Salicylates   mg/dL


 


Urine Opiates Screen   (NEGATIVE)  


 


Ur Oxycodone Screen   (NEGATIVE)  


 


Urine Methadone Screen   (NEGATIVE)  


 


Ur Propoxyphene Screen   (NEGATIVE)  


 


Acetaminophen   (10-30)  ug/mL


 


Ur Barbiturates Screen   (NEGATIVE)  


 


Ur Tricyclics Screen   (NEGATIVE)  


 


Ur Phencyclidine Scrn   (NEGATIVE)  


 


Ur Amphetamine Screen   (NEGATIVE)  


 


U Methamphetamines Scrn   (NEGATIVE)  


 


U Benzodiazepines Scrn   (NEGATIVE)  


 


Urine Cocaine Screen   (NEGATIVE)  


 


U Cannabinoids Screen   (NEGATIVE)  


 


Ethyl Alcohol   mg/dL


 


Influenza A (Rapid)   (Negative)  


 


Influenza B (Rapid)   (Negative)  














Assessment/Plan





- Problem List


(1) ICH (intracerebral hemorrhage)


Impression: 


Patient's POLST form indicated comfort measures only. Goals of care are directed

for comfort only. Patient wondering how long until she passes. She would like to

die in the hospital, but is aware that since she is doing ok, it may be more 

like days. Patient is currently in the process of moving from one facility to 

another.


Plan:


- comfort measures


- patient can eat/drink for pleasure


- hospice was consulted this morning


- MSW consulted 





Qualifiers: 


   Intracerebral hemorrhage etiology: nontraumatic   Cerebral hemorrhage 

location: unspecified cerebral location   Laterality: unspecified laterality   

Qualified Code(s): I61.9 - Nontraumatic intracerebral hemorrhage, unspecified

## 2019-03-20 RX ADMIN — MORPHINE SULFATE PRN MG: 2 INJECTION, SOLUTION INTRAMUSCULAR; INTRAVENOUS at 17:25

## 2019-03-20 RX ADMIN — MORPHINE SULFATE PRN MG: 2 INJECTION, SOLUTION INTRAMUSCULAR; INTRAVENOUS at 08:30

## 2019-03-20 RX ADMIN — SODIUM CHLORIDE PRN MLS/HR: 9 INJECTION, SOLUTION INTRAVENOUS at 09:14

## 2019-03-20 NOTE — PROVIDER PROGRESS NOTE
Subjective





- Prog Note Date


Prog Note Date: 03/20/19


Prog Note Time: 09:15





- Subjective


Subjective: 


Patient complaining of headache today


MSW is working on placement





Current Medications





- Current Medications


Current Medications: 


Carboxymethylcellulose (Refresh 1% Ophth Drops)  1 drops EACHEYE Q4HR PRN


   PRN Reason: Dry Eye


Glycopyrrolate (Robinul)  0.2 mg SUBQ Q4H PRN


   PRN Reason: Excessive secretions


Sodium Chloride (Normal Saline 0.9%)  1,000 mls @ 0 mls/hr IV .Q0M LISY


   Last Infusion: 03/19/19 21:58 Dose:  Infused


Sodium Chloride (Normal Saline 0.9%)  500 mls @ 20 mls/hr IV Q24H PRN


   PRN Reason: TKO RATE


   Last Admin: 03/20/19 09:14 Dose:  20 mls/hr


Lorazepam (Ativan Inj (Vial))  1 mg IVP Q6H PRN


   PRN Reason: Anxiety/Agitation


   Last Admin: 03/20/19 11:13 Dose:  1 mg


Mineral Oil (Cavilon)  1 applic TOP PRN PRN


   PRN Reason: Skin Care


Morphine Sulfate (Morphine)  2 mg IVP Q2HR PRN


   PRN Reason: PAIN OR SHORTNESS OF AIR


   Last Admin: 03/20/19 08:30 Dose:  2 mg


Ondansetron HCl (Zofran Inj)  4 mg IVP Q8H PRN


   PRN Reason: Nausea / Vomiting


   Last Admin: 03/19/19 14:31 Dose:  4 mg








Objective





- Vital Signs/Intake & Output


Reviewed Vital Signs: Yes


Intake & Output: 


                                 Intake & Output











 03/17/19 03/18/19 03/19/19 03/20/19





 23:59 23:59 23:59 23:59


 


Intake Total   981.8 


 


Output Total   1700 225


 


Balance   -718.2 -225














- Objective


General Appearance: positive: No acute distress, Alert


Eyes: OD Lid inflammation (irritation)


ENT: positive: Dry mucous membranes


Neck: positive: Trachea midline


Respiratory: positive: Chest non-tender, No respiratory distress, Breath sounds 

nml


Cardiovascular: positive: Regular rate & rhythm, No murmur, No gallop


Peripheral Pulses: 2+ Dorsalis pedis (R), 2+ Dorsalis pedis (L)


Abdomen: positive: Non-tender, No organomegaly, Nml bowel sounds, No distention


Skin: positive: Warm, Dry


Extremities: positive: No pedal edema


Neurologic/Psychiatric: positive: Oriented x3, Sensation nml, Weakness





- Lab Results


Fish Bones: 


                                 03/18/19 18:18





                                 03/18/19 18:18





Assessment/Plan





- Problem List


(1) ICH (intracerebral hemorrhage)


Impression: 


Patient's POLST form indicated comfort measures only. Goals of care are directed

for comfort only. 


Plan:


- comfort focused care


- patient can eat/drink for pleasure


- MSW working on setting up placement in SNF


- hospice referral has been placed, will see patient once she leaves


Qualifiers: 


   Intracerebral hemorrhage etiology: nontraumatic   Cerebral hemorrhage 

location: unspecified cerebral location   Laterality: unspecified laterality   

Qualified Code(s): I61.9 - Nontraumatic intracerebral hemorrhage, unspecified

## 2019-03-21 RX ADMIN — MORPHINE SULFATE PRN MG: 2 INJECTION, SOLUTION INTRAMUSCULAR; INTRAVENOUS at 16:49

## 2019-03-21 RX ADMIN — MORPHINE SULFATE PRN MG: 2 INJECTION, SOLUTION INTRAMUSCULAR; INTRAVENOUS at 02:57

## 2019-03-21 RX ADMIN — SODIUM CHLORIDE PRN MLS/HR: 9 INJECTION, SOLUTION INTRAVENOUS at 10:33

## 2019-03-21 RX ADMIN — ONDANSETRON PRN MG: 2 INJECTION INTRAMUSCULAR; INTRAVENOUS at 12:15

## 2019-03-21 RX ADMIN — MORPHINE SULFATE PRN MG: 2 INJECTION, SOLUTION INTRAMUSCULAR; INTRAVENOUS at 08:55

## 2019-03-21 NOTE — PROVIDER PROGRESS NOTE
Subjective





- Prog Note Date


Prog Note Date: 03/21/19


Prog Note Time: 08:15





- Subjective


Subjective: 


Patient with headache


Denies appetite, does have a dry mouth


Awaiting placement for hospice





Current Medications





- Current Medications


Current Medications: 


Carboxymethylcellulose (Refresh 1% Ophth Drops)  1 drops EACHEYE Q4HR PRN


   PRN Reason: Dry Eye


Glycopyrrolate (Robinul)  0.2 mg SUBQ Q4H PRN


   PRN Reason: Excessive secretions


Sodium Chloride (Normal Saline 0.9%)  1,000 mls @ 0 mls/hr IV .Q0M LISY


   Last Infusion: 03/19/19 21:58 Dose:  Infused


Sodium Chloride (Normal Saline 0.9%)  500 mls @ 20 mls/hr IV Q24H PRN


   PRN Reason: TKO RATE


   Last Admin: 03/21/19 10:33 Dose:  20 mls/hr


Lorazepam (Ativan Inj (Vial))  1 mg IVP Q6H PRN


   PRN Reason: Anxiety/Agitation


   Last Admin: 03/21/19 12:15 Dose:  1 mg


Mineral Oil (Cavilon)  1 applic TOP PRN PRN


   PRN Reason: Skin Care


Morphine Sulfate (Morphine)  2 mg IVP Q2HR PRN


   PRN Reason: PAIN OR SHORTNESS OF AIR


   Last Admin: 03/21/19 08:55 Dose:  2 mg


Ondansetron HCl (Zofran Inj)  4 mg IVP Q8H PRN


   PRN Reason: Nausea / Vomiting


   Last Admin: 03/21/19 12:15 Dose:  4 mg








Objective





- Vital Signs/Intake & Output


Reviewed Vital Signs: Yes


Intake & Output: 


                                 Intake & Output











 03/18/19 03/19/19 03/20/19 03/21/19





 23:59 23:59 23:59 23:59


 


Intake Total  981.8 675.333 424.667


 


Output Total  1700 750 925


 


Balance  -718.2 -74.667 -500.333














- Objective


General Appearance: positive: No acute distress, Alert


ENT: positive: Dry mucous membranes


Respiratory: positive: No respiratory distress


Cardiovascular: positive: Regular rate & rhythm


Skin: positive: Warm, Dry


Neurologic/Psychiatric: positive: Oriented x3, Weakness





- Lab Results


Fish Bones: 


                                 03/18/19 18:18





                                 03/18/19 18:18





Assessment/Plan





- Problem List


(1) ICH (intracerebral hemorrhage)


Impression: 


Patient's POLST form indicated comfort measures only. Goals of care are directed

for comfort only. 


Plan:


- comfort focused care


- patient can eat/drink for pleasure


- MSW working on setting up placement in SNF


- hospice referral has been placed, will see patient once she leaves


Qualifiers: 


   Intracerebral hemorrhage etiology: nontraumatic   Cerebral hemorrhage locatio

n: unspecified cerebral location   Laterality: unspecified laterality   

Qualified Code(s): I61.9 - Nontraumatic intracerebral hemorrhage, unspecified

## 2019-03-22 VITALS — SYSTOLIC BLOOD PRESSURE: 166 MMHG | DIASTOLIC BLOOD PRESSURE: 85 MMHG

## 2019-03-22 RX ADMIN — MORPHINE SULFATE PRN MG: 2 INJECTION, SOLUTION INTRAMUSCULAR; INTRAVENOUS at 15:47

## 2019-03-22 RX ADMIN — MORPHINE SULFATE PRN MG: 2 INJECTION, SOLUTION INTRAMUSCULAR; INTRAVENOUS at 04:45

## 2019-03-22 RX ADMIN — MORPHINE SULFATE PRN MG: 2 INJECTION, SOLUTION INTRAMUSCULAR; INTRAVENOUS at 10:38

## 2019-03-22 NOTE — DISCHARGE SUMMARY
Discharge Summary


Admit Date: 03/18/19


Discharge Date: 03/22/19


Discharging Provider: Tianna GILMORE


Code Status: Do Not Attempt Resuscitation


Condition at Discharge: Poor


Discharge Disposition: 50 Hospice/Home DC/Xfer


Discharge Facility Name: [facility]





- DIAGNOSES


Admission Diagnoses: 


Intracranial Hemorrhage


Discharge Diagnoses with Status of Each Condition: 


Intracranial Hemorrhage, stable





- HPI


History of Present Illness: 


As per Dr. Agustin Mccormick's H&P dated 3/18/2019:


'88-year-old female was sent in from her nursing facility where she has on the 

independent care side for altered mental status.  Normally the patient's alert 

and able to function independently.  The patient is unable to provide any 

history


Her sister Sara was present at bedside during my exam. She has been been 

informed by the facility that the patient had fallen and call out while down. 

Someone passing by heard her and came to her aid,then she was brought to the ED.


Patient is currently altered. She appears very nauseous and vomits 

intermittently. She complains of a head ache. She is moving all extremities. She

has intermittent apneic periods. She startles easily.


Work up in the ED included a CT of her brain which acute intracranial 

hemorrhage. 


Patient has a POLST form stating comfort-measures only'





- CONSULTS | PROCEDURES


Consultations: None





- HOSPITAL COURSE


Hospital Course: 


Patient admitted to the hospital and placed on comfort measures only, per her 

wishes. Hospice consult was placed. She remains alert and able to make her needs

known. She was able to eat/drink as she wished, which was minimal. She 

complained of frequent headaches, which were relieved with morphine. Of note, 

patient is very sensitive to smells and is quick to let you know of scents that 

bother her.








- ALLERGIES


Allergies/Adverse Reactions: 


                                    Allergies











Allergy/AdvReac Type Severity Reaction Status Date / Time


 


ACE Inhibitors Allergy  Unknown Verified 03/18/19 17:52


 


Benzodiazepines Allergy  Unknown Verified 03/18/19 17:52


 


Tetracyclines Allergy  Unknown Verified 03/18/19 17:52


 


procaine [From Novocain] AdvReac  Unknown Verified 03/19/19 14:55














- MEDICATIONS


Home Medications: 


                                Ambulatory Orders











 Medication  Instructions  Recorded  Confirmed


 


LORazepam [Lorazepam] 1 mg PO Q6HR PRN #20 tablet 03/22/19 


 


Morphine Sulfate [Morphine Sulf 10 mg PO Q1H PRN #150 ml 03/22/19 





Oral (Roxanol)]   


 


Ondansetron Odt [Zofran Odt] 4 mg TL Q6H PRN #15 tablet 03/22/19 


 


Polyvinyl Alcohol [Artificial 15 ml OP Q4HR PRN #2 drops 03/22/19 





Tears]   














- PHYSICAL EXAM AT DISCHARGE


Physical Exam Other/Comments: 


General Appearance: positive: No acute distress, Alert


ENT: positive: Dry mucous membranes


Respiratory: positive: No respiratory distress


Cardiovascular: positive: Regular rate & rhythm


Skin: positive: Warm, Dry


Neurologic/Psychiatric: positive: Oriented x3, Weakness








- LABS


Result Diagrams: 


                                 03/18/19 18:18





                                 03/18/19 18:18


Other Lab Results: 








                                Laboratory Tests











  03/18/19 03/18/19 03/18/19





  18:15 18:15 18:15


 


WBC   


 


RBC   


 


Hgb   


 


Hct   


 


MCV   


 


MCH   


 


MCHC   


 


RDW   


 


Plt Count   


 


MPV   


 


Neut # (Auto)   


 


Lymph # (Auto)   


 


Mono # (Auto)   


 


Eos # (Auto)   


 


Baso # (Auto)   


 


Absolute Nucleated RBC   


 


Nucleated RBC %   


 


PT   


 


INR   


 


Sodium   


 


Potassium   


 


Chloride   


 


Carbon Dioxide   


 


Anion Gap   


 


BUN   


 


Creatinine   


 


Estimated GFR (MDRD)   


 


Glucose   


 


Calcium   


 


Total Bilirubin   


 


AST   


 


ALT   


 


Alkaline Phosphatase   


 


Troponin I   


 


Total Protein   


 


Albumin   


 


Globulin   


 


Albumin/Globulin Ratio   


 


Lipase   


 


Urine Color  YELLOW  


 


Urine Clarity  CLEAR  


 


Urine pH  6.0  


 


Ur Specific Gravity  1.020  


 


Urine Protein  30 H  


 


Urine Glucose (UA)  NEGATIVE  


 


Urine Ketones  NEGATIVE  


 


Urine Occult Blood  TRACE-INTA  


 


Urine Nitrite  NEGATIVE  


 


Urine Bilirubin  NEGATIVE  


 


Urine Urobilinogen  0.2 (NORMAL)  


 


Ur Leukocyte Esterase  NEGATIVE  


 


Urine RBC  0-5  


 


Urine WBC  0-3  


 


Ur Squamous Epith Cells  RARE Squamous  


 


Urine Bacteria  None Seen  


 


Ur Microscopic Review  INDICATED  


 


Urine Culture Comments  NOT INDICATED  


 


Salicylates   


 


Urine Opiates Screen   NEGATIVE 


 


Ur Oxycodone Screen   NEGATIVE 


 


Urine Methadone Screen   NEGATIVE 


 


Ur Propoxyphene Screen   NEGATIVE 


 


Acetaminophen   


 


Ur Barbiturates Screen   NEGATIVE 


 


Ur Tricyclics Screen   NEGATIVE 


 


Ur Phencyclidine Scrn   NEGATIVE 


 


Ur Amphetamine Screen   NEGATIVE 


 


U Methamphetamines Scrn   NEGATIVE 


 


U Benzodiazepines Scrn   NEGATIVE 


 


Urine Cocaine Screen   NEGATIVE 


 


U Cannabinoids Screen   NEGATIVE 


 


Ethyl Alcohol   


 


Influenza A (Rapid)    Negative


 


Influenza B (Rapid)    Negative














  03/18/19 03/18/19 03/18/19





  18:18 18:18 18:18


 


WBC  6.3  


 


RBC  4.18 L  


 


Hgb  13.3  


 


Hct  39.8  


 


MCV  95.2  


 


MCH  31.7 H  


 


MCHC  33.3  


 


RDW  13.1  


 


Plt Count  201  


 


MPV  8.8  


 


Neut # (Auto)  4.3  


 


Lymph # (Auto)  1.3 L  


 


Mono # (Auto)  0.5  


 


Eos # (Auto)  0.2  


 


Baso # (Auto)  0.0  


 


Absolute Nucleated RBC  0.00  


 


Nucleated RBC %  0.0  


 


PT   12.8 H 


 


INR   1.1 


 


Sodium    136


 


Potassium    3.3 L


 


Chloride    100 L


 


Carbon Dioxide    26


 


Anion Gap    10.0


 


BUN    18


 


Creatinine    0.7


 


Estimated GFR (MDRD)    79 L


 


Glucose    146 H


 


Calcium    9.0


 


Total Bilirubin    0.6


 


AST    27


 


ALT    19


 


Alkaline Phosphatase    74


 


Troponin I   


 


Total Protein    7.8


 


Albumin    4.2


 


Globulin    3.6


 


Albumin/Globulin Ratio    1.2


 


Lipase    46


 


Urine Color   


 


Urine Clarity   


 


Urine pH   


 


Ur Specific Gravity   


 


Urine Protein   


 


Urine Glucose (UA)   


 


Urine Ketones   


 


Urine Occult Blood   


 


Urine Nitrite   


 


Urine Bilirubin   


 


Urine Urobilinogen   


 


Ur Leukocyte Esterase   


 


Urine RBC   


 


Urine WBC   


 


Ur Squamous Epith Cells   


 


Urine Bacteria   


 


Ur Microscopic Review   


 


Urine Culture Comments   


 


Salicylates    < 6.0


 


Urine Opiates Screen   


 


Ur Oxycodone Screen   


 


Urine Methadone Screen   


 


Ur Propoxyphene Screen   


 


Acetaminophen    < 10 L


 


Ur Barbiturates Screen   


 


Ur Tricyclics Screen   


 


Ur Phencyclidine Scrn   


 


Ur Amphetamine Screen   


 


U Methamphetamines Scrn   


 


U Benzodiazepines Scrn   


 


Urine Cocaine Screen   


 


U Cannabinoids Screen   


 


Ethyl Alcohol    < 5.0


 


Influenza A (Rapid)   


 


Influenza B (Rapid)   














  03/18/19





  18:18


 


WBC 


 


RBC 


 


Hgb 


 


Hct 


 


MCV 


 


MCH 


 


MCHC 


 


RDW 


 


Plt Count 


 


MPV 


 


Neut # (Auto) 


 


Lymph # (Auto) 


 


Mono # (Auto) 


 


Eos # (Auto) 


 


Baso # (Auto) 


 


Absolute Nucleated RBC 


 


Nucleated RBC % 


 


PT 


 


INR 


 


Sodium 


 


Potassium 


 


Chloride 


 


Carbon Dioxide 


 


Anion Gap 


 


BUN 


 


Creatinine 


 


Estimated GFR (MDRD) 


 


Glucose 


 


Calcium 


 


Total Bilirubin 


 


AST 


 


ALT 


 


Alkaline Phosphatase 


 


Troponin I  < 0.04


 


Total Protein 


 


Albumin 


 


Globulin 


 


Albumin/Globulin Ratio 


 


Lipase 


 


Urine Color 


 


Urine Clarity 


 


Urine pH 


 


Ur Specific Gravity 


 


Urine Protein 


 


Urine Glucose (UA) 


 


Urine Ketones 


 


Urine Occult Blood 


 


Urine Nitrite 


 


Urine Bilirubin 


 


Urine Urobilinogen 


 


Ur Leukocyte Esterase 


 


Urine RBC 


 


Urine WBC 


 


Ur Squamous Epith Cells 


 


Urine Bacteria 


 


Ur Microscopic Review 


 


Urine Culture Comments 


 


Salicylates 


 


Urine Opiates Screen 


 


Ur Oxycodone Screen 


 


Urine Methadone Screen 


 


Ur Propoxyphene Screen 


 


Acetaminophen 


 


Ur Barbiturates Screen 


 


Ur Tricyclics Screen 


 


Ur Phencyclidine Scrn 


 


Ur Amphetamine Screen 


 


U Methamphetamines Scrn 


 


U Benzodiazepines Scrn 


 


Urine Cocaine Screen 


 


U Cannabinoids Screen 


 


Ethyl Alcohol 


 


Influenza A (Rapid) 


 


Influenza B (Rapid) 














- DIAGNOSTIC IMAGING


Diagnostic Imaging Results: Final report reviewed


Diagnostic Imaging Results Comments: 


CT head without contrast 3/18/2019:


New acute intracranial hemorrhage in the left quadrigeminal cistern, bilateral 

adjacent tentorium, and right cerebellum. 








- FOLLOW UP


Follow Up: 


Hospice to evaluate patient upon admission to facility





- TIME SPENT


Time Spent in Discharge (Minutes): 30

## 2019-03-22 NOTE — DISCHARGE PLAN
Discharge Plan for SNF / [facility]





- Discharge Plan And Transition Orders


Disposition: 50 Hospice/Home DC/Xfer


Condition: Poor


Allergies and Adverse Reactions: 


                                    Allergies











Allergy/AdvReac Type Severity Reaction Status Date / Time


 


ACE Inhibitors Allergy  Unknown Verified 03/18/19 17:52


 


Benzodiazepines Allergy  Unknown Verified 03/18/19 17:52


 


Tetracyclines Allergy  Unknown Verified 03/18/19 17:52


 


procaine [From Novocain] AdvReac  Unknown Verified 03/19/19 14:55














- SNF / [facility] Transition Orders


Admit to (Facility): [facility]


Discharge Diagnosis: 


Intracranial Hemorrhage


Medicare Certification Statement: 


I certify that [facility] skilled nursing care is medically necessary on a 

continuing basis for any of the conditions for which she/he is receiving care 

during hospitalization.





Notify PCP of admission and forward orders to primary provider for signature.





Other Notification Orders: 


Call PCP immediately if patient develops dyspnea, chest pain/tightness or edema.





Additional Bowel Program Orders: 


If no BM after 2 days, nurse may give M.O.M. 30ml PO PRN and/or ducolax Supp 1 

MT and/or GEO 250mg P.O., and/or senna 1-2 tabs PO.  On day 3 nurse may give 

repeat above order until residents constipation is resolved. 





Treatments & Other Orders: Patient on comfort measures only


Oxygen Orders: Oxygen as needed, for comfort


Lab Tests or X-ray Orders: None


Medication Orders: 





PLEASE REFER TO THE DISCHARGE MEDICATION LIST.











- Medications


New Prescriptions: 


Polyvinyl Alcohol [Artificial Tears] 15 ml OP Q4HR PRN #2 drops


 PRN Reason: dry eyes


LORazepam [Lorazepam] 1 mg PO Q6HR PRN #20 tablet


 PRN Reason: Anxiety


Morphine Sulfate [Morphine Sulf Oral (Roxanol)] 10 mg PO Q1H PRN #150 ml


 PRN Reason: Pain/Dyspnea


Ondansetron Odt [Zofran Odt] 4 mg TL Q6H PRN #15 tablet


 PRN Reason: Nausea / Vomiting





- Diet


Texture: Regular


Liquids: Thin English